# Patient Record
Sex: FEMALE | Race: WHITE
[De-identification: names, ages, dates, MRNs, and addresses within clinical notes are randomized per-mention and may not be internally consistent; named-entity substitution may affect disease eponyms.]

---

## 2018-03-22 ENCOUNTER — HOSPITAL ENCOUNTER (EMERGENCY)
Dept: HOSPITAL 80 - FED | Age: 28
Discharge: HOME | End: 2018-03-22
Payer: COMMERCIAL

## 2018-03-22 VITALS
SYSTOLIC BLOOD PRESSURE: 111 MMHG | TEMPERATURE: 98.1 F | DIASTOLIC BLOOD PRESSURE: 80 MMHG | OXYGEN SATURATION: 100 % | RESPIRATION RATE: 18 BRPM | HEART RATE: 76 BPM

## 2018-03-22 DIAGNOSIS — E86.9: ICD-10-CM

## 2018-03-22 DIAGNOSIS — R53.1: ICD-10-CM

## 2018-03-22 DIAGNOSIS — H53.8: Primary | ICD-10-CM

## 2018-03-22 LAB — PLATELET # BLD: 135 10^3/UL (ref 150–400)

## 2018-03-22 NOTE — CPEKG
Heart Rate: 72

RR Interval: 833

P-R Interval: 192

QRSD Interval: 86

QT Interval: 400

QTC Interval: 438

P Axis: 58

QRS Axis: 58

T Wave Axis: 22

EKG Severity - BORDERLINE ECG -

EKG Impression: SINUS ARRHYTHMIA, RATE 67-79

EKG Impression: VENTRICULAR PREMATURE COMPLEX

EKG Impression: INTERPOLATED VENTRICULAR PREMATURE COMPLEX

EKG Impression: BORDERLINE T ABNORMALITIES, ANTERIOR LEADS

Electronically Signed By: Alvarado Castaneda 22-Mar-2018 21:53:52

## 2018-03-22 NOTE — EDPHY
HPI/HX/ROS/PE/MDM


Narrative: 





CHIEF COMPLAINT:  Blurred vision, tingling in legs, heavy arms





HISTORY OF PRESENT ILLNESS:   The patient is a 29 y/o female with a history of 

tension headaches complaining of blurred vision, heavy arms, and tingling in 

her legs, worse in her left leg, onset 4:00 PM, 2 hours ago. Last week, she had 

diarrhea, fever, chills, myalgias, sharp and stabbing abdominal pain, nausea, 

and vomiting. The fever, chills, myalgia, and vomiting has resolved. She felt 

normal Sunday and Monday. Tuesday, the nausea and sharp pain in her left upper 

quadrant returned and has not resolved. She hasn't eaten much for the past few 

days. Two days ago she had Botox injections to her nose and forehead. Today, 

around 4:00PM, she began having blurred vision with sections of her visual 

field absent. In addition she had some colorful shimmering in the edges of her 

sight. The symptoms did not resolve and half an hour later she developed 

tingling in her legs, worse on the right, heaviness in her arms, and tingling 

in her face. She has an associated headache in the back of her head. She denies 

difficulty walking, difficulty swallowing, or any other associated symptoms. 

She denies recent trauma or changes in medication. She uses nicotine in the 

form of vaping. She denies heavy drinking, marijuana use, or illicit drug use. 

She uses Nuva ring for birth control and denies chance of pregnancy. 





No fever, chills, chest pain, shortness of breath, palpitations, vomiting, 

diarrhea, urinary complaints, lightheadedness. 





REVIEW OF SYSTEMS:


Aside from elements discussed in the HPI, a comprehensive 10-point review of 

systems was reviewed and is negative.





PAST MEDICAL HISTORY:   Depression, ADHD, tension headaches, Botox injections





SOCIAL HISTORY:   PCP: Zhanna, works at [employer], social drinker





VITAL SIGNS: Reviewed by me


GENERAL: Well-developed, well-nourished. Flat affect. Slow to answer questions.


HEENT: Atraumatic. Eyes: No icterus, no injection.  No nystagmus.  PERRL, EOMI.

  Cranial nerves 2-12 are intact.  Mouth: moist mucous membranes.  No erythema 

or lesions. Neck: supple with no adenopathy.


LUNGS: Clear to auscultation bilaterally, no wheezes, rhonchi or rales.


CARDIAC: Regular rate and rhythm, no rubs, murmurs or gallops.


ABDOMEN: Soft, nontender, nondistended, bowel sounds normal.


BACK:  No CVA tenderness.


EXTREMITIES: No trauma. No edema.  Range of motion is normal throughout.


NEURO: Alert and oriented,  grossly nonfocal.  Somewhat slow to answer 

questions.  Motor strength 5/5 throughout.  Sensation intact to light touch 

throughout.  Normal finger-to-nose.


SKIN: Warm and dry, no rash.


PSYCHIATRIC: Normal mentation, no agitation.





ED Course: 


12-LEAD EKG:  Please see the full report in [facility].  My interpretation: 

Sinus arrhythmia





The patient presents with visual distortions, heaviness in her arms, and 

tingling in her face and legs onset 2 hours ago. She was ill last week with 

nausea, vomiting, diarrhea, and abdominal pain. On exam, she has a nonfocal 

neurologic exam.  She seems to have flat affect and is slow to answer 

questions. Plan for fluids, CBC, basic metabolic panel, troponin, and EKG.





6:35 PM- EKG indicates sinus arrhythmia. Labs are not indicative of an 

etiology. Plan for urinalysis and drug screen as the patient continues to have 

a flat affect and is slightly "spacey".





8:00 p.m.:  Patient is reexamined.  She is feeling better.  The arms feel not 

as heavy.  She has had no further visual complaints.  She has developed no 

headache.  She has a nonfocal neurologic exam.  Her affect is bright.  She is 

conversant without difficulties.  She feels comfortable being discharged with 

close follow up with her primary care physician.  No headache.  No neck pain.  

No numbness or tingling.  No weakness.  No speech difficulty.


MDM: 





Differential diagnoses the patient's presenting complaints was considered 

including but not limited to intracranial injury, TIA, ischemic cerebrovascular

  accident, hemorrhagic cerebrovascular accident, hypoglycemia, complex migraine

, metastases, tumor, seizure, or electrolyte abnormality





- Data Points


Laboratory Results: 


 Laboratory Results





 03/22/18 18:48 





 03/22/18 18:48 





 











  03/22/18 03/22/18 03/22/18





  19:43 18:48 18:48


 


WBC      





    


 


RBC      





    


 


Hgb      





    


 


Hct      





    


 


MCV      





    


 


MCH      





    


 


MCHC      





    


 


RDW      





    


 


Plt Count      





    


 


MPV      





    


 


Neut % (Auto)      





    


 


Lymph % (Auto)      





    


 


Mono % (Auto)      





    


 


Eos % (Auto)      





    


 


Baso % (Auto)      





    


 


Nucleat RBC Rel Count      





    


 


Absolute Neuts (auto)      





    


 


Absolute Lymphs (auto)      





    


 


Absolute Monos (auto)      





    


 


Absolute Eos (auto)      





    


 


Absolute Basos (auto)      





    


 


Absolute Nucleated RBC      





    


 


Immature Gran %      





    


 


Immature Gran #      





    


 


Sodium      138 mEq/L mEq/L





     (135-145) 


 


Potassium      4.2 mEq/L mEq/L





     (3.5-5.2) 


 


Chloride      102 mEq/L mEq/L





     () 


 


Carbon Dioxide      25 mEq/l mEq/l





     (22-31) 


 


Anion Gap      11 mEq/L mEq/L





     (8-16) 


 


BUN      10 mg/dL mg/dL





     (7-23) 


 


Creatinine      0.6 mg/dL mg/dL





     (0.6-1.0) 


 


Estimated GFR      > 60 





    


 


Glucose      70 mg/dL mg/dL





     () 


 


Calcium      8.8 mg/dL mg/dL





     (8.5-10.4) 


 


Troponin I      < 0.012 ng/mL ng/mL





     (0.000-0.034) 


 


Beta HCG, Qual    NEGATIVE   





    


 


Urine Color  YELLOW     





    


 


Urine Appearance  HAZY     





    


 


Urine pH  7.0     





   (5.0-7.5)   


 


Ur Specific Gravity  1.012     





   (1.002-1.030)   


 


Urine Protein  NEGATIVE     





   (NEGATIVE)   


 


Urine Ketones  NEGATIVE     





   (NEGATIVE)   


 


Urine Blood  NEGATIVE     





   (NEGATIVE)   


 


Urine Nitrate  NEGATIVE     





   (NEGATIVE)   


 


Urine Bilirubin  NEGATIVE     





   (NEGATIVE)   


 


Urine Urobilinogen  NEGATIVE EU EU    





   (0.2-1.0)   


 


Ur Leukocyte Esterase  NEGATIVE     





   (NEGATIVE)   


 


Urine RBC  1-3 /hpf /hpf    





   (0-3)   


 


Urine WBC  1-3 /hpf /hpf    





   (0-3)   


 


Ur Epithelial Cells  TRACE /lpf /lpf    





   (NONE-1+)   


 


Urine Bacteria  TRACE /hpf H /hpf    





   (NONE SEEN)   


 


Urine Mucus  TRACE /lpf /lpf    





   (NONE-1+)   


 


Urine Glucose  NEGATIVE     





   (NEGATIVE)   


 


Urine Opiates Screen  NEGATIVE     





   (NEGATIVE)   


 


Urine Barbiturates  NEGATIVE     





   (NEGATIVE)   


 


Ur Phencyclidine Scrn  NEGATIVE     





   (NEGATIVE)   


 


Ur Amphetamine Screen  NON-NEGATIVE  H     





   (NEGATIVE)   


 


U Benzodiazepines Scrn  NEGATIVE     





   (NEGATIVE)   


 


Urine Cocaine Screen  NEGATIVE     





   (NEGATIVE)   


 


U Marijuana (THC) Screen  NEGATIVE     





   (NEGATIVE)   














  03/22/18





  18:48


 


WBC  5.23 10^3/uL 10^3/uL





   (3.80-9.50) 


 


RBC  4.53 10^6/uL 10^6/uL





   (4.18-5.33) 


 


Hgb  13.7 g/dL g/dL





   (12.6-16.3) 


 


Hct  38.8 % %





   (38.0-47.0) 


 


MCV  85.7 fL fL





   (81.5-99.8) 


 


MCH  30.2 pg pg





   (27.9-34.1) 


 


MCHC  35.3 g/dL g/dL





   (32.4-36.7) 


 


RDW  11.5 % %





   (11.5-15.2) 


 


Plt Count  135 10^3/uL L 10^3/uL





   (150-400) 


 


MPV  10.8 fL fL





   (8.7-11.7) 


 


Neut % (Auto)  44.5 % %





   (39.3-74.2) 


 


Lymph % (Auto)  46.5 % H %





   (15.0-45.0) 


 


Mono % (Auto)  7.6 % %





   (4.5-13.0) 


 


Eos % (Auto)  1.0 % %





   (0.6-7.6) 


 


Baso % (Auto)  0.2 % L %





   (0.3-1.7) 


 


Nucleat RBC Rel Count  0.0 % %





   (0.0-0.2) 


 


Absolute Neuts (auto)  2.33 10^3/uL 10^3/uL





   (1.70-6.50) 


 


Absolute Lymphs (auto)  2.43 10^3/uL 10^3/uL





   (1.00-3.00) 


 


Absolute Monos (auto)  0.40 10^3/uL 10^3/uL





   (0.30-0.80) 


 


Absolute Eos (auto)  0.05 10^3/uL 10^3/uL





   (0.03-0.40) 


 


Absolute Basos (auto)  0.01 10^3/uL L 10^3/uL





   (0.02-0.10) 


 


Absolute Nucleated RBC  0.00 10^3/uL 10^3/uL





   (0-0.01) 


 


Immature Gran %  0.2 % %





   (0.0-1.1) 


 


Immature Gran #  0.01 10^3/uL 10^3/uL





   (0.00-0.10) 


 


Sodium  





  


 


Potassium  





  


 


Chloride  





  


 


Carbon Dioxide  





  


 


Anion Gap  





  


 


BUN  





  


 


Creatinine  





  


 


Estimated GFR  





  


 


Glucose  





  


 


Calcium  





  


 


Troponin I  





  


 


Beta HCG, Qual  





  


 


Urine Color  





  


 


Urine Appearance  





  


 


Urine pH  





  


 


Ur Specific Gravity  





  


 


Urine Protein  





  


 


Urine Ketones  





  


 


Urine Blood  





  


 


Urine Nitrate  





  


 


Urine Bilirubin  





  


 


Urine Urobilinogen  





  


 


Ur Leukocyte Esterase  





  


 


Urine RBC  





  


 


Urine WBC  





  


 


Ur Epithelial Cells  





  


 


Urine Bacteria  





  


 


Urine Mucus  





  


 


Urine Glucose  





  


 


Urine Opiates Screen  





  


 


Urine Barbiturates  





  


 


Ur Phencyclidine Scrn  





  


 


Ur Amphetamine Screen  





  


 


U Benzodiazepines Scrn  





  


 


Urine Cocaine Screen  





  


 


U Marijuana (THC) Screen  





  











Medications Given: 


 








Discontinued Medications





Sodium Chloride (Ns)  1,000 mls @ 0 mls/hr IV ONCE ONE; Wide Open


   PRN Reason: Protocol


   Stop: 03/22/18 18:10


   Last Admin: 03/22/18 18:49 Dose:  1,000 mls








General


Time Seen by Provider: 03/22/18 17:47


Initial Vital Signs: 


 Initial Vital Signs











Temperature (C)  36.6 C   03/22/18 17:25


 


Heart Rate  84   03/22/18 17:25


 


Respiratory Rate  16   03/22/18 17:25


 


Blood Pressure  127/94 H  03/22/18 17:25


 


O2 Sat (%)  97   03/22/18 17:25








 











O2 Delivery Mode               Room Air














Allergies/Adverse Reactions: 


 





Sulfa (Sulfonamide Antibiotics) Allergy (Intermediate, Verified 03/22/18 17:31)


 Hives








Home Medications: 














 Medication  Instructions  Recorded


 


Amphet Asp and D/Amphet [Adderall 10 mg PO 03/22/18





10 MG (*)]  


 


Citalopram [CeleXA] 20 mg PO 03/22/18














Departure





- Departure


Disposition: Home, Routine, Self-Care


Clinical Impression: 


 Blurred vision, Weakness





Condition: Good


Instructions:  Weakness (ED), Blurred Vision (ED)


Additional Instructions: 


1. Get plenty of rest. Drink plenty of water.


2. Follow-up with your primary care provider.


3. Return to the emergency department for recurrence of symptoms. 


Referrals: 


Rosa Isela Chao MD [Primary Care Provider] - As per Instructions


Report Scribed for: Nirmala Thornton


Report Scribed by: Imelda Lester


Date of Report: 03/22/18


Time of Report: 18:21


Physician Review and Approval Statement: Portions of this note were transcribed 

by a medical scribe.  I personally performed a history, physical exam, medical 

decision making, and confirmed accuracy of information the transcribed note.

## 2019-07-11 ENCOUNTER — HOSPITAL ENCOUNTER (OUTPATIENT)
Dept: HOSPITAL 35 - ULTRA | Age: 29
End: 2019-07-11
Attending: NURSE PRACTITIONER
Payer: COMMERCIAL

## 2019-07-11 DIAGNOSIS — R10.10: Primary | ICD-10-CM

## 2019-08-14 ENCOUNTER — HOSPITAL ENCOUNTER (OUTPATIENT)
Dept: HOSPITAL 35 - NUC | Age: 29
End: 2019-08-14
Attending: SURGERY
Payer: COMMERCIAL

## 2019-08-14 DIAGNOSIS — R10.9: Primary | ICD-10-CM

## 2019-08-14 DIAGNOSIS — R19.7: ICD-10-CM
